# Patient Record
Sex: MALE | Employment: FULL TIME | URBAN - METROPOLITAN AREA
[De-identification: names, ages, dates, MRNs, and addresses within clinical notes are randomized per-mention and may not be internally consistent; named-entity substitution may affect disease eponyms.]

---

## 2019-11-20 ENCOUNTER — TRANSFERRED RECORDS (OUTPATIENT)
Dept: HEALTH INFORMATION MANAGEMENT | Facility: CLINIC | Age: 54
End: 2019-11-20

## 2020-03-04 ENCOUNTER — TRANSFERRED RECORDS (OUTPATIENT)
Dept: HEALTH INFORMATION MANAGEMENT | Facility: CLINIC | Age: 55
End: 2020-03-04

## 2020-03-09 ENCOUNTER — MEDICAL CORRESPONDENCE (OUTPATIENT)
Dept: HEALTH INFORMATION MANAGEMENT | Facility: CLINIC | Age: 55
End: 2020-03-09

## 2020-03-10 ENCOUNTER — TRANSCRIBE ORDERS (OUTPATIENT)
Dept: OTHER | Age: 55
End: 2020-03-10

## 2020-03-10 DIAGNOSIS — M79.89 SOFT TISSUE MASS: ICD-10-CM

## 2020-03-10 DIAGNOSIS — M79.672 PAIN OF LEFT HEEL: Primary | ICD-10-CM

## 2020-03-16 ENCOUNTER — PRE VISIT (OUTPATIENT)
Dept: ORTHOPEDICS | Facility: CLINIC | Age: 55
End: 2020-03-16

## 2020-03-16 ENCOUNTER — OFFICE VISIT (OUTPATIENT)
Dept: ORTHOPEDICS | Facility: CLINIC | Age: 55
End: 2020-03-16
Payer: COMMERCIAL

## 2020-03-16 VITALS
BODY MASS INDEX: 29.39 KG/M2 | HEIGHT: 72 IN | SYSTOLIC BLOOD PRESSURE: 138 MMHG | TEMPERATURE: 98 F | DIASTOLIC BLOOD PRESSURE: 93 MMHG | HEART RATE: 85 BPM | WEIGHT: 217 LBS

## 2020-03-16 DIAGNOSIS — M79.672 CHRONIC HEEL PAIN, LEFT: Primary | ICD-10-CM

## 2020-03-16 DIAGNOSIS — G89.29 CHRONIC HEEL PAIN, LEFT: Primary | ICD-10-CM

## 2020-03-16 ASSESSMENT — MIFFLIN-ST. JEOR: SCORE: 1853.34

## 2020-03-16 ASSESSMENT — PAIN SCALES - GENERAL: PAINLEVEL: EXTREME PAIN (8)

## 2020-03-16 NOTE — LETTER
3/16/2020       RE: Adrian Alvarado  [address] [place] [address]     Dear Colleague,    Thank you for referring your patient, Adrian Alvarado, to the [facility] at [facility]. Please see a copy of my visit note below.        [facility] Phys[facility], Orthopaedic Oncology Surgery Consultation  by Fer Crow M.D.    Adrian Alvarado MRN# [id number]   Age: 54 year old YOB: 1965     Requesting physician: Dr. Arpan Olivares DPM     Background history:  DX:  1. Left quadratus plantar muscle lipoma    TREATMENTS:  1. None          Assessment and Plan:   Assessment:  ***  I do not see evidence of a mass or lipoma.   No evidence tarsal tunnel syndrome      Plan:  ***    Heel cup orthotics injections            History of Present Illness:   Adrian Alvarado is a 54 year old male who presents today for evaluation of a left foot mass. The patient was seen recently on 3/4/2020 at the [facility] by Dr. Olivares for chronic left heel pain that has been worsening with time. He had undergone an MRI of the left heel on 11/21/2019, which was suggestive of plantar fasciitis but also showed an intrinsic muscular lipoma in the quadratus plantar muscle. Given the presence of the lipoma, he was referred here for further evaluation.     Here, the patient reports that the pain has been going on for 5-6 years, progressively worsening with time. It is present consistently but worsens throughout the day with walking. It is worse with weightbearing. He has tried orthotics, nighttime splints, and a boot, but the pain has only gotten worse. No tingling. Pain does not radiate to the toes. Patient says he has no prior history of plantar fasciitis.          Physical Exam:     EXAMINATION pertinent findings:   PSYCH: Pleasant, healthy-appearing, alert, oriented x3, cooperative. Normal mood and affect.  VITAL SIGNS: There were no vitals taken for this visit..   Reviewed nursing intake notes.   There is no height or weight on file to calculate BMI.  RESP: non labored breathing   ABD: benign, soft, non-tender, no acute peritoneal findings  SKIN: grossly normal   LYMPHATIC: grossly normal, no adenopathy, no extremity edema  NEURO: grossly normal , no motor deficits  VASCULAR: satisfactory perfusion of all extremities   MUSCULOSKELETAL:   ***                   Data:   All laboratory data reviewed  All imaging studies reviewed by me    MR Left Heel (11/20/2019):  1.  Plantar fasciitis with doris-facial edema, thickening of proximal plantar fascia and mild plantar calcaneal bone marrow edema.  2. 1.2 cm intramuscular lipoma within the proximal quadratus plantae muscle.  3. Minor Achilles tendinosis. Mild edema within Kager s triangle.  4. The ankle joint space and ligaments are intact.  5. Os peroneum. Slight marrow edema within the distal aspect of the ossicle. No fracture of the ossicle. The peroneus longus tendon is intact.   Per radiology.     Attending MD (Dr. Fer Crow) Attestation :  This patient was seen and evaluated by me including a history, exam, and interpretation of all imaging and/or lab data.  Either a training physician (resident/fellow), who also saw the patient, or scribe has documented the clinic visit in the attached note.    Fer Crow MD  [facility] Family Professor  Oncology and Adult Reconstructive Surgery  Dept Orthopaedic Surgery, [facility] Phys[facility]  [phone] office, [phone] pager  [email or web address]      DATA for DOCUMENTATION:         Past Medical History:   There is no problem list on file for this patient.    No past medical history on file.    Also see scanned health assessment forms.       Past Surgical History:   No past surgical history on file.         Social History:     Social History     Socioeconomic History     Marital status:      Spouse name: Not on file     Number of children: Not on file     Years of education: Not  on file     Highest education level: Not on file   Occupational History     Not on file   Social Needs     Financial resource strain: Not on file     Food insecurity     Worry: Not on file     Inability: Not on file     Transportation needs     Medical: Not on file     Non-medical: Not on file   Tobacco Use     Smoking status: Not on file   Substance and Sexual Activity     Alcohol use: Not on file     Drug use: Not on file     Sexual activity: Not on file   Lifestyle     Physical activity     Days per week: Not on file     Minutes per session: Not on file     Stress: Not on file   Relationships     Social connections     Talks on phone: Not on file     Gets together: Not on file     Attends Protestant service: Not on file     Active member of club or organization: Not on file     Attends meetings of clubs or organizations: Not on file     Relationship status: Not on file     Intimate partner violence     Fear of current or ex partner: Not on file     Emotionally abused: Not on file     Physically abused: Not on file     Forced sexual activity: Not on file   Other Topics Concern     Not on file   Social History Narrative     Not on file            Family History:     No family history on file.         Medications:     No current outpatient medications on file.     No current facility-administered medications for this visit.               Review of Systems:   A comprehensive 10 point review of systems (constitutional, ENT, cardiac, peripheral vascular, lymphatic, respiratory, GI, , Musculoskeletal, skin, Neurological) was performed and found to be negative except as described in this note.     See intake form completed by patient     Scribe Disclosure:  TREVON, Andreas Alaniz, am serving as a scribe to document services personally performed by Fer Crow MD at this visit, based upon the provider's statements to me. All documentation has been reviewed by the aforementioned provider prior to being entered into the  official medical record.     Again, thank you for allowing me to participate in the care of your patient.      Sincerely,    Fer Crow MD

## 2020-03-16 NOTE — LETTER
3/16/2020      RE: Adrian Alvarado  [address] [place] [address]           [facility] Physicians, Orthopaedic Oncology Surgery Consultation  by Fer Crow M.D.    Adrian Alvarado MRN# [id number]   Age: 54 year old YOB: 1965     Requesting physician: Dr. Kolton Olivares DPM     Background history:  DX:  1. Left quadratus plantar muscle lipoma    TREATMENTS:  1. None          Assessment and Plan:   Assessment:  Left heel pain. I do not see evidence of a mass or lipoma on review of the patient's MRI. Differential diagnosis includes plantar fasciitis vs tarsal tunnel syndrome. ***     Plan:  I advised the patient to follow-up with podiatry. He can consider a diagnostic block of the tarsal tunnel or a corticosteroid injection. He can continue to use orthotics or a heel cup. ***            History of Present Illness:   Adrian Alvarado is a 54 year old male who presents today for evaluation of a left foot mass. The patient was seen recently on 3/4/2020 at the [facility] by Dr. Olivares for chronic left heel pain that has been worsening with time. He had undergone an MRI of the left heel on 11/21/2019, which was suggestive of plantar fasciitis but also showed an intrinsic muscular lipoma in the quadratus plantar muscle. Given the presence of the lipoma, he was referred here for further evaluation.     Here, the patient reports that the pain has been going on for 5-6 years, progressively worsening with time. It is present consistently but worsens throughout the day with walking. It is worse with weightbearing. He has tried orthotics, nighttime splints, and a boot, but the pain has only gotten worse. No tingling. Pain does not radiate to the toes. Patient says he has no prior history of plantar fasciitis.          Physical Exam:     EXAMINATION pertinent findings:   PSYCH: Pleasant, healthy-appearing, alert, oriented x3, cooperative. Normal mood and affect.  VITAL SIGNS: Blood pressure  "(!) 138/93, pulse 85, temperature 98  F (36.7  C), height 1.822 m (5' 11.75\"), weight 98.4 kg (217 lb)..  Reviewed nursing intake notes.   Body mass index is 29.64 kg/m .  RESP: non labored breathing   SKIN: grossly normal   NEURO: grossly normal , no motor deficits  VASCULAR: satisfactory perfusion of the left foot.   MUSCULOSKELETAL: Normal gait.   Tenderness on plantar medial surface of left heel. Negative Tinel's sign over the tarsal tunnel. Normal hind foot, mid foot, and tibial talar motion. No palpable mass appreciated. No asymmetry clinically with the contralateral foot.          Data:   All laboratory data reviewed  All imaging studies reviewed by me    MR Left Heel (11/20/2019):  Review of MRI images show small adipose nodule is not in close proximity to the tibial nerve. No evidence of tarsal tunnel syndrome or nerve compression.     Per radiologist's read:  1.  Plantar fasciitis with doris-facial edema, thickening of proximal plantar fascia and mild plantar calcaneal bone marrow edema.  2. 1.2 cm intramuscular lipoma within the proximal quadratus plantae muscle.  3. Minor Achilles tendinosis. Mild edema within Kager s triangle.  4. The ankle joint space and ligaments are intact.  5. Os peroneum. Slight marrow edema within the distal aspect of the ossicle. No fracture of the ossicle. The peroneus longus tendon is intact.        Attending MD (Dr. Fer Crow) Attestation :  This patient was seen and evaluated by me including a history, exam, and interpretation of all imaging and/or lab data.  Either a training physician (resident/fellow), who also saw the patient, or scribe has documented the clinic visit in the attached note.    [facility] Family Professor  Oncology and Adult Reconstructive Surgery  Dept Orthopaedic Surgery, [facility]  [phone] office, [phone] pager  [email or web address]      DATA for DOCUMENTATION:         Past Medical History:   There is no problem list on " file for this patient.    No past medical history on file.    Also see scanned health assessment forms.       Past Surgical History:   No past surgical history on file.         Social History:     Social History     Socioeconomic History     Marital status:      Spouse name: Not on file     Number of children: Not on file     Years of education: Not on file     Highest education level: Not on file   Occupational History     Not on file   Social Needs     Financial resource strain: Not on file     Food insecurity     Worry: Not on file     Inability: Not on file     Transportation needs     Medical: Not on file     Non-medical: Not on file   Tobacco Use     Smoking status: Never Smoker     Smokeless tobacco: Never Used   Substance and Sexual Activity     Alcohol use: Not on file     Drug use: Not on file     Sexual activity: Not on file   Lifestyle     Physical activity     Days per week: Not on file     Minutes per session: Not on file     Stress: Not on file   Relationships     Social connections     Talks on phone: Not on file     Gets together: Not on file     Attends Orthodox service: Not on file     Active member of club or organization: Not on file     Attends meetings of clubs or organizations: Not on file     Relationship status: Not on file     Intimate partner violence     Fear of current or ex partner: Not on file     Emotionally abused: Not on file     Physically abused: Not on file     Forced sexual activity: Not on file   Other Topics Concern     Not on file   Social History Narrative     Not on file            Family History:     No family history on file.         Medications:     No current outpatient medications on file.     No current facility-administered medications for this visit.               Review of Systems:   A comprehensive 10 point review of systems (constitutional, ENT, cardiac, peripheral vascular, lymphatic, respiratory, GI, , Musculoskeletal, skin, Neurological) was  performed and found to be negative except as described in this note.     See intake form completed by patient     Scribe Disclosure:  I, Andreas Alaniz, am serving as a scribe to document services personally performed by Fer Crow MD at this visit, based upon the provider's statements to me. All documentation has been reviewed by the aforementioned provider prior to being entered into the official medical record.     Fer Crow MD

## 2020-03-16 NOTE — PROGRESS NOTES
"    [facility] Physicians, Orthopaedic Oncology Surgery Consultation  by Fer Crow M.D.    Adrian Alvarado MRN# [id number]   Age: 54 year old YOB: 1965     Requesting physician: Dr. Kolton Olivares DPM     Background history:  DX:  1. Left quadratus plantar muscle lipoma    TREATMENTS:  1. None          Assessment and Plan:   Assessment:  Left heel pain. I do not see evidence of a mass or lipoma on review of the patient's MRI. Differential diagnosis includes plantar fasciitis vs tarsal tunnel syndrome.     Plan:  I advised the patient to follow-up with podiatry. He can consider a diagnostic block of the tarsal tunnel or a corticosteroid injection. He can continue to use orthotics or a heel cup.            History of Present Illness:   Adrian Alvarado is a 54 year old male who presents today for evaluation of a left foot mass. The patient was seen recently on 3/4/2020 at the [facility] by Dr. Olivares for chronic left heel pain that has been worsening with time. He had undergone an MRI of the left heel on 11/21/2019, which was suggestive of plantar fasciitis but also showed an intrinsic muscular lipoma in the quadratus plantar muscle. Given the presence of the lipoma, he was referred here for further evaluation.     Here, the patient reports that the pain has been going on for 5-6 years, progressively worsening with time. It is present consistently but worsens throughout the day with walking. It is worse with weightbearing. He has tried orthotics, nighttime splints, and a boot, but the pain has only gotten worse. No tingling. Pain does not radiate to the toes. Patient says he has no prior history of plantar fasciitis.          Physical Exam:     EXAMINATION pertinent findings:   PSYCH: Pleasant, healthy-appearing, alert, oriented x3, cooperative. Normal mood and affect.  VITAL SIGNS: Blood pressure (!) 138/93, pulse 85, temperature 98  F (36.7  C), height 1.822 m (5' 11.75\"), " weight 98.4 kg (217 lb)..  Reviewed nursing intake notes.   Body mass index is 29.64 kg/m .  RESP: non labored breathing   SKIN: grossly normal   NEURO: grossly normal , no motor deficits  VASCULAR: satisfactory perfusion of the left foot.   MUSCULOSKELETAL: Normal gait.   Tenderness on plantar medial surface of left heel. Negative Tinel's sign over the tarsal tunnel. Normal hind foot, mid foot, and tibial talar motion. No palpable mass appreciated. No asymmetry clinically with the contralateral foot.          Data:   All laboratory data reviewed  All imaging studies reviewed by me    MR Left Heel (11/20/2019):  Review of MRI images show small adipose nodule is not in close proximity to the tibial nerve. No evidence of tarsal tunnel syndrome or nerve compression.     Per radiologist's read:  1.  Plantar fasciitis with doris-facial edema, thickening of proximal plantar fascia and mild plantar calcaneal bone marrow edema.  2. 1.2 cm intramuscular lipoma within the proximal quadratus plantae muscle.  3. Minor Achilles tendinosis. Mild edema within Kager s triangle.  4. The ankle joint space and ligaments are intact.  5. Os peroneum. Slight marrow edema within the distal aspect of the ossicle. No fracture of the ossicle. The peroneus longus tendon is intact.        Attending MD (Dr. Fer Crow) Attestation :  This patient was seen and evaluated by me including a history, exam, and interpretation of all imaging and/or lab data.  Either a training physician (resident/fellow), who also saw the patient, or scribe has documented the clinic visit in the attached note.    Fer Crow MD  [facility] Family Professor  Oncology and Adult Reconstructive Surgery  Dept Orthopaedic Surgery, [facility]  [phone] office, [phone] pager  [email or web address]      DATA for DOCUMENTATION:         Past Medical History:   There is no problem list on file for this patient.    No past medical history on file.    Also see scanned  health assessment forms.       Past Surgical History:   No past surgical history on file.         Social History:     Social History     Socioeconomic History     Marital status:      Spouse name: Not on file     Number of children: Not on file     Years of education: Not on file     Highest education level: Not on file   Occupational History     Not on file   Social Needs     Financial resource strain: Not on file     Food insecurity     Worry: Not on file     Inability: Not on file     Transportation needs     Medical: Not on file     Non-medical: Not on file   Tobacco Use     Smoking status: Never Smoker     Smokeless tobacco: Never Used   Substance and Sexual Activity     Alcohol use: Not on file     Drug use: Not on file     Sexual activity: Not on file   Lifestyle     Physical activity     Days per week: Not on file     Minutes per session: Not on file     Stress: Not on file   Relationships     Social connections     Talks on phone: Not on file     Gets together: Not on file     Attends Denominational service: Not on file     Active member of club or organization: Not on file     Attends meetings of clubs or organizations: Not on file     Relationship status: Not on file     Intimate partner violence     Fear of current or ex partner: Not on file     Emotionally abused: Not on file     Physically abused: Not on file     Forced sexual activity: Not on file   Other Topics Concern     Not on file   Social History Narrative     Not on file            Family History:     No family history on file.         Medications:     No current outpatient medications on file.     No current facility-administered medications for this visit.               Review of Systems:   A comprehensive 10 point review of systems (constitutional, ENT, cardiac, peripheral vascular, lymphatic, respiratory, GI, , Musculoskeletal, skin, Neurological) was performed and found to be negative except as described in this note.     See intake  form completed by patient     Scribe Disclosure:  I, Andreas Alaniz, am serving as a scribe to document services personally performed by Fer Crow MD at this visit, based upon the provider's statements to me. All documentation has been reviewed by the aforementioned provider prior to being entered into the official medical record.

## 2020-03-16 NOTE — LETTER
3/16/2020      RE: Adrian Alvarado  [address] [place] [address]           [facility], Orthopaedic Oncology Surgery Consultation  by Fer Crow M.D.    Adrian Alvarado MRN# [id number]   Age: 54 year old YOB: 1965     Requesting physician: Dr. Kolton Olivares DPM     Background history:  DX:  1. Left quadratus plantar muscle lipoma    TREATMENTS:  1. None          Assessment and Plan:   Assessment:  Left heel pain. I do not see evidence of a mass or lipoma on review of the patient's MRI. Differential diagnosis includes plantar fasciitis vs tarsal tunnel syndrome. ***     Plan:  I advised the patient to follow-up with podiatry. He can consider a diagnostic block of the tarsal tunnel or a corticosteroid injection. He can continue to use orthotics or a heel cup. ***            History of Present Illness:   Adrian Alvarado is a 54 year old male who presents today for evaluation of a left foot mass. The patient was seen recently on 3/4/2020 at the [facility] by Dr. Olivares for chronic left heel pain that has been worsening with time. He had undergone an MRI of the left heel on 11/21/2019, which was suggestive of plantar fasciitis but also showed an intrinsic muscular lipoma in the quadratus plantar muscle. Given the presence of the lipoma, he was referred here for further evaluation.     Here, the patient reports that the pain has been going on for 5-6 years, progressively worsening with time. It is present consistently but worsens throughout the day with walking. It is worse with weightbearing. He has tried orthotics, nighttime splints, and a boot, but the pain has only gotten worse. No tingling. Pain does not radiate to the toes. Patient says he has no prior history of plantar fasciitis.          Physical Exam:     EXAMINATION pertinent findings:   PSYCH: Pleasant, healthy-appearing, alert, oriented x3, cooperative. Normal mood and affect.  VITAL SIGNS: Blood pressure  "(!) 138/93, pulse 85, temperature 98  F (36.7  C), height 1.822 m (5' 11.75\"), weight 98.4 kg (217 lb)..  Reviewed nursing intake notes.   Body mass index is 29.64 kg/m .  RESP: non labored breathing   SKIN: grossly normal   NEURO: grossly normal , no motor deficits  VASCULAR: satisfactory perfusion of the left foot.   MUSCULOSKELETAL: Normal gait.   Tenderness on plantar medial surface of left heel. Negative Tinel's sign over the tarsal tunnel. Normal hind foot, mid foot, and tibial talar motion. No palpable mass appreciated. No asymmetry clinically with the contralateral foot.          Data:   All laboratory data reviewed  All imaging studies reviewed by me    MR Left Heel (11/20/2019):  Review of MRI images show small adipose nodule is not in close proximity to the tibial nerve. No evidence of tarsal tunnel syndrome or nerve compression.     Per radiologist's read:  1.  Plantar fasciitis with doris-facial edema, thickening of proximal plantar fascia and mild plantar calcaneal bone marrow edema.  2. 1.2 cm intramuscular lipoma within the proximal quadratus plantae muscle.  3. Minor Achilles tendinosis. Mild edema within Kager s triangle.  4. The ankle joint space and ligaments are intact.  5. Os peroneum. Slight marrow edema within the distal aspect of the ossicle. No fracture of the ossicle. The peroneus longus tendon is intact.        Attending MD (Dr. Fer Crow) Attestation :  This patient was seen and evaluated by me including a history, exam, and interpretation of all imaging and/or lab data.  Either a training physician (resident/fellow), who also saw the patient, or scribe has documented the clinic visit in the attached note.    MD Tru Davis Family Professor  Oncology and Adult Reconstructive Surgery  Dept Orthopaedic Surgery, [facility]  [phone] office, [phone] pager  [email or web address]      DATA for DOCUMENTATION:         Past Medical History:   There is no problem list on " file for this patient.    No past medical history on file.    Also see scanned health assessment forms.       Past Surgical History:   No past surgical history on file.         Social History:     Social History     Socioeconomic History     Marital status:      Spouse name: Not on file     Number of children: Not on file     Years of education: Not on file     Highest education level: Not on file   Occupational History     Not on file   Social Needs     Financial resource strain: Not on file     Food insecurity     Worry: Not on file     Inability: Not on file     Transportation needs     Medical: Not on file     Non-medical: Not on file   Tobacco Use     Smoking status: Never Smoker     Smokeless tobacco: Never Used   Substance and Sexual Activity     Alcohol use: Not on file     Drug use: Not on file     Sexual activity: Not on file   Lifestyle     Physical activity     Days per week: Not on file     Minutes per session: Not on file     Stress: Not on file   Relationships     Social connections     Talks on phone: Not on file     Gets together: Not on file     Attends Mosque service: Not on file     Active member of club or organization: Not on file     Attends meetings of clubs or organizations: Not on file     Relationship status: Not on file     Intimate partner violence     Fear of current or ex partner: Not on file     Emotionally abused: Not on file     Physically abused: Not on file     Forced sexual activity: Not on file   Other Topics Concern     Not on file   Social History Narrative     Not on file            Family History:     No family history on file.         Medications:     No current outpatient medications on file.     No current facility-administered medications for this visit.               Review of Systems:   A comprehensive 10 point review of systems (constitutional, ENT, cardiac, peripheral vascular, lymphatic, respiratory, GI, , Musculoskeletal, skin, Neurological) was  performed and found to be negative except as described in this note.     See intake form completed by patient     Scribe Disclosure:  I, Andreas Alaniz, am serving as a scribe to document services personally performed by Fer Crow MD at this visit, based upon the provider's statements to me. All documentation has been reviewed by the aforementioned provider prior to being entered into the official medical record.     Fer Crow MD

## 2020-03-16 NOTE — LETTER
3/16/2020      RE: Adrian Alvarado  [address] [place] [address]           [facility], Orthopaedic Oncology Surgery Consultation  by Fer Crow M.D.    Adrian Alvarado MRN# [id number]   Age: 54 year old YOB: 1965     Requesting physician: Dr. Kolton Olivares DPM     Background history:  DX:  1. Left quadratus plantar muscle lipoma    TREATMENTS:  1. None          Assessment and Plan:   Assessment:  Left heel pain. I do not see evidence of a mass or lipoma on review of the patient's MRI. Differential diagnosis includes plantar fasciitis vs tarsal tunnel syndrome.     Plan:  I advised the patient to follow-up with podiatry. He can consider a diagnostic block of the tarsal tunnel or a corticosteroid injection. He can continue to use orthotics or a heel cup.            History of Present Illness:   Adrian Alvardao is a 54 year old male who presents today for evaluation of a left foot mass. The patient was seen recently on 3/4/2020 at the [facility] by Dr. Olivares for chronic left heel pain that has been worsening with time. He had undergone an MRI of the left heel on 11/21/2019, which was suggestive of plantar fasciitis but also showed an intrinsic muscular lipoma in the quadratus plantar muscle. Given the presence of the lipoma, he was referred here for further evaluation.     Here, the patient reports that the pain has been going on for 5-6 years, progressively worsening with time. It is present consistently but worsens throughout the day with walking. It is worse with weightbearing. He has tried orthotics, nighttime splints, and a boot, but the pain has only gotten worse. No tingling. Pain does not radiate to the toes. Patient says he has no prior history of plantar fasciitis.          Physical Exam:     EXAMINATION pertinent findings:   PSYCH: Pleasant, healthy-appearing, alert, oriented x3, cooperative. Normal mood and affect.  VITAL SIGNS: Blood pressure (!)  "138/93, pulse 85, temperature 98  F (36.7  C), height 1.822 m (5' 11.75\"), weight 98.4 kg (217 lb)..  Reviewed nursing intake notes.   Body mass index is 29.64 kg/m .  RESP: non labored breathing   SKIN: grossly normal   NEURO: grossly normal , no motor deficits  VASCULAR: satisfactory perfusion of the left foot.   MUSCULOSKELETAL: Normal gait.   Tenderness on plantar medial surface of left heel. Negative Tinel's sign over the tarsal tunnel. Normal hind foot, mid foot, and tibial talar motion. No palpable mass appreciated. No asymmetry clinically with the contralateral foot.          Data:   All laboratory data reviewed  All imaging studies reviewed by me    MR Left Heel (11/20/2019):  Review of MRI images show small adipose nodule is not in close proximity to the tibial nerve. No evidence of tarsal tunnel syndrome or nerve compression.     Per radiologist's read:  1.  Plantar fasciitis with doris-facial edema, thickening of proximal plantar fascia and mild plantar calcaneal bone marrow edema.  2. 1.2 cm intramuscular lipoma within the proximal quadratus plantae muscle.  3. Minor Achilles tendinosis. Mild edema within Kager s triangle.  4. The ankle joint space and ligaments are intact.  5. Os peroneum. Slight marrow edema within the distal aspect of the ossicle. No fracture of the ossicle. The peroneus longus tendon is intact.        Attending MD (Dr. Fer Crow) Attestation :  This patient was seen and evaluated by me including a history, exam, and interpretation of all imaging and/or lab data.  Either a training physician (resident/fellow), who also saw the patient, or scribe has documented the clinic visit in the attached note.    MD Tru Davis Family Professor  Oncology and Adult Reconstructive Surgery  Dept Orthopaedic Surgery, [facility]  [phone] office, [phone] pager  [email or web address]      DATA for DOCUMENTATION:         Past Medical History:   There is no problem list on " file for this patient.    No past medical history on file.    Also see scanned health assessment forms.       Past Surgical History:   No past surgical history on file.         Social History:     Social History     Socioeconomic History     Marital status:      Spouse name: Not on file     Number of children: Not on file     Years of education: Not on file     Highest education level: Not on file   Occupational History     Not on file   Social Needs     Financial resource strain: Not on file     Food insecurity     Worry: Not on file     Inability: Not on file     Transportation needs     Medical: Not on file     Non-medical: Not on file   Tobacco Use     Smoking status: Never Smoker     Smokeless tobacco: Never Used   Substance and Sexual Activity     Alcohol use: Not on file     Drug use: Not on file     Sexual activity: Not on file   Lifestyle     Physical activity     Days per week: Not on file     Minutes per session: Not on file     Stress: Not on file   Relationships     Social connections     Talks on phone: Not on file     Gets together: Not on file     Attends Spiritism service: Not on file     Active member of club or organization: Not on file     Attends meetings of clubs or organizations: Not on file     Relationship status: Not on file     Intimate partner violence     Fear of current or ex partner: Not on file     Emotionally abused: Not on file     Physically abused: Not on file     Forced sexual activity: Not on file   Other Topics Concern     Not on file   Social History Narrative     Not on file            Family History:     No family history on file.         Medications:     No current outpatient medications on file.     No current facility-administered medications for this visit.               Review of Systems:   A comprehensive 10 point review of systems (constitutional, ENT, cardiac, peripheral vascular, lymphatic, respiratory, GI, , Musculoskeletal, skin, Neurological) was  performed and found to be negative except as described in this note.     See intake form completed by patient     Scribe Disclosure:  I, Andreas Alaniz, am serving as a scribe to document services personally performed by Fer Crow MD at this visit, based upon the provider's statements to me. All documentation has been reviewed by the aforementioned provider prior to being entered into the official medical record.     Fer Crow MD

## 2021-01-04 ENCOUNTER — HEALTH MAINTENANCE LETTER (OUTPATIENT)
Age: 56
End: 2021-01-04

## 2021-10-10 ENCOUNTER — HEALTH MAINTENANCE LETTER (OUTPATIENT)
Age: 56
End: 2021-10-10

## 2022-01-30 ENCOUNTER — HEALTH MAINTENANCE LETTER (OUTPATIENT)
Age: 57
End: 2022-01-30

## 2022-09-24 ENCOUNTER — HEALTH MAINTENANCE LETTER (OUTPATIENT)
Age: 57
End: 2022-09-24

## 2023-05-08 ENCOUNTER — HEALTH MAINTENANCE LETTER (OUTPATIENT)
Age: 58
End: 2023-05-08